# Patient Record
(demographics unavailable — no encounter records)

---

## 2025-04-22 NOTE — PHYSICAL EXAM
[FreeTextEntry2] : left arm examined, Nexplanon implant palpated , superficially placed in the subcutaneous tissue on the upper aspect of left arm in the triceps area. non-tender on exam

## 2025-04-22 NOTE — DISCUSSION/SUMMARY
[FreeTextEntry1] : A - Radicular pain in left arm  P- Left arm examined Nexplanon palpated, superficial in location, no redness, swelling, warmth or tenderness identified,      pt reassured that her left arm discomfort  is not related to her Nexplanon     pt advised to f/u with PCP at [facility] and obtain a referral for Neurologist to evaluate concern.

## 2025-04-22 NOTE — HISTORY OF PRESENT ILLNESS
[FreeTextEntry1] : pt presents for follow up, for concern over pain and tingling in her left forearm and upper arm area. pt is concerned that the pain may be potentially related to her Nexplanon contraceptive implant.